# Patient Record
Sex: FEMALE | Race: BLACK OR AFRICAN AMERICAN | Employment: FULL TIME | URBAN - METROPOLITAN AREA
[De-identification: names, ages, dates, MRNs, and addresses within clinical notes are randomized per-mention and may not be internally consistent; named-entity substitution may affect disease eponyms.]

---

## 2023-01-22 ENCOUNTER — HOSPITAL ENCOUNTER (EMERGENCY)
Age: 28
Discharge: HOME | End: 2023-01-22
Attending: EMERGENCY MEDICINE

## 2023-01-22 ENCOUNTER — APPOINTMENT (OUTPATIENT)
Dept: ULTRASOUND IMAGING | Age: 28
End: 2023-01-22
Attending: NURSE PRACTITIONER

## 2023-01-22 VITALS
RESPIRATION RATE: 16 BRPM | OXYGEN SATURATION: 99 % | DIASTOLIC BLOOD PRESSURE: 79 MMHG | HEIGHT: 65 IN | HEART RATE: 77 BPM | BODY MASS INDEX: 37.15 KG/M2 | WEIGHT: 223 LBS | SYSTOLIC BLOOD PRESSURE: 127 MMHG

## 2023-01-22 DIAGNOSIS — Z34.90 EARLY STAGE OF PREGNANCY: ICD-10-CM

## 2023-01-22 DIAGNOSIS — O46.90 VAGINAL BLEEDING IN PREGNANCY: Primary | ICD-10-CM

## 2023-01-22 LAB
ABO + RH BLD: NORMAL
ALBUMIN SERPL-MCNC: 3.7 G/DL (ref 3.5–5)
ALBUMIN/GLOB SERPL: 1 (ref 1.1–2.2)
ALP SERPL-CCNC: 47 U/L (ref 45–117)
ALT SERPL-CCNC: 14 U/L (ref 12–78)
ANION GAP SERPL CALC-SCNC: 9 MMOL/L (ref 5–15)
APPEARANCE UR: ABNORMAL
AST SERPL W P-5'-P-CCNC: 11 U/L (ref 15–37)
BACTERIA URNS QL MICRO: ABNORMAL /HPF
BASOPHILS # BLD: 0 K/UL (ref 0–0.1)
BASOPHILS NFR BLD: 0 % (ref 0–1)
BILIRUB SERPL-MCNC: 0.3 MG/DL (ref 0.2–1)
BILIRUB UR QL: NEGATIVE
BUN SERPL-MCNC: 8 MG/DL (ref 6–20)
BUN/CREAT SERPL: 9 (ref 12–20)
CA-I BLD-MCNC: 9.9 MG/DL (ref 8.5–10.1)
CHLORIDE SERPL-SCNC: 104 MMOL/L (ref 97–108)
CO2 SERPL-SCNC: 24 MMOL/L (ref 21–32)
COLOR UR: YELLOW
CREAT SERPL-MCNC: 0.86 MG/DL (ref 0.55–1.02)
DIFFERENTIAL METHOD BLD: NORMAL
EOSINOPHIL # BLD: 0.1 K/UL (ref 0–0.4)
EOSINOPHIL NFR BLD: 1 % (ref 0–7)
EPITH CASTS URNS QL MICRO: ABNORMAL /LPF
ERYTHROCYTE [DISTWIDTH] IN BLOOD BY AUTOMATED COUNT: 14.4 % (ref 11.5–14.5)
GLOBULIN SER CALC-MCNC: 3.8 G/DL (ref 2–4)
GLUCOSE SERPL-MCNC: 107 MG/DL (ref 65–100)
GLUCOSE UR STRIP.AUTO-MCNC: NEGATIVE MG/DL
HCG SERPL QL: POSITIVE
HCT VFR BLD AUTO: 38.1 % (ref 35–47)
HGB BLD-MCNC: 12.5 G/DL (ref 11.5–16)
HGB UR QL STRIP: ABNORMAL
IMM GRANULOCYTES # BLD AUTO: 0 K/UL (ref 0–0.04)
IMM GRANULOCYTES NFR BLD AUTO: 0 % (ref 0–0.5)
KETONES UR QL STRIP.AUTO: NEGATIVE MG/DL
LEUKOCYTE ESTERASE UR QL STRIP.AUTO: ABNORMAL
LYMPHOCYTES # BLD: 1.7 K/UL (ref 0.8–3.5)
LYMPHOCYTES NFR BLD: 23 % (ref 12–49)
MCH RBC QN AUTO: 26.4 PG (ref 26–34)
MCHC RBC AUTO-ENTMCNC: 32.8 G/DL (ref 30–36.5)
MCV RBC AUTO: 80.4 FL (ref 80–99)
MONOCYTES # BLD: 0.6 K/UL (ref 0–1)
MONOCYTES NFR BLD: 9 % (ref 5–13)
NEUTS SEG # BLD: 5.1 K/UL (ref 1.8–8)
NEUTS SEG NFR BLD: 67 % (ref 32–75)
NITRITE UR QL STRIP.AUTO: NEGATIVE
PH UR STRIP: 6 (ref 5–8)
PLATELET # BLD AUTO: 371 K/UL (ref 150–400)
PMV BLD AUTO: 9.2 FL (ref 8.9–12.9)
POTASSIUM SERPL-SCNC: 3.8 MMOL/L (ref 3.5–5.1)
PROT SERPL-MCNC: 7.5 G/DL (ref 6.4–8.2)
PROT UR STRIP-MCNC: 30 MG/DL
RBC # BLD AUTO: 4.74 M/UL (ref 3.8–5.2)
RBC #/AREA URNS HPF: ABNORMAL /HPF (ref 0–5)
SODIUM SERPL-SCNC: 137 MMOL/L (ref 136–145)
SP GR UR REFRACTOMETRY: 1.01 (ref 1–1.03)
UROBILINOGEN UR QL STRIP.AUTO: 0.1 EU/DL (ref 0.2–1)
WBC # BLD AUTO: 7.5 K/UL (ref 3.6–11)
WBC URNS QL MICRO: ABNORMAL /HPF (ref 0–4)

## 2023-01-22 PROCEDURE — 76817 TRANSVAGINAL US OBSTETRIC: CPT

## 2023-01-22 PROCEDURE — 84703 CHORIONIC GONADOTROPIN ASSAY: CPT

## 2023-01-22 PROCEDURE — 36415 COLL VENOUS BLD VENIPUNCTURE: CPT

## 2023-01-22 PROCEDURE — 85025 COMPLETE CBC W/AUTO DIFF WBC: CPT

## 2023-01-22 PROCEDURE — 80053 COMPREHEN METABOLIC PANEL: CPT

## 2023-01-22 PROCEDURE — 99284 EMERGENCY DEPT VISIT MOD MDM: CPT

## 2023-01-22 PROCEDURE — 86900 BLOOD TYPING SEROLOGIC ABO: CPT

## 2023-01-22 PROCEDURE — 81001 URINALYSIS AUTO W/SCOPE: CPT

## 2023-01-22 PROCEDURE — 76801 OB US < 14 WKS SINGLE FETUS: CPT

## 2023-01-22 NOTE — ED TRIAGE NOTES
Pt ambulatory to [facility]. Pt  c/o of vaginal bleeding that started this morning. States she is 6 weeks pregnant. . Denies any pain, sob, cp, dizziness, weakness, lightheaded, headache. Report slight nausea. Is not currently being followed by OB.

## 2023-01-22 NOTE — DISCHARGE INSTRUCTIONS
Thank you! Thank you for allowing me to care for you in the emergency department. It is my goal to provide you with excellent care. If you have not received excellent quality care, please ask to speak to the nurse manager. Please fill out the survey that will come to you by mail or email since we listen to your feedback! Below you will find a list of your tests from today's visit. Should you have any questions, please do not hesitate to call the emergency department. Labs  Recent Results (from the past 12 hour(s))   BLOOD TYPE, (ABO+RH)    Collection Time: 01/22/23 12:20 PM   Result Value Ref Range    ABO/Rh(D) B Positive    CBC WITH AUTOMATED DIFF    Collection Time: 01/22/23 12:20 PM   Result Value Ref Range    WBC 7.5 3.6 - 11.0 K/uL    RBC 4.74 3.80 - 5.20 M/uL    HGB 12.5 11.5 - 16.0 g/dL    HCT 38.1 35.0 - 47.0 %    MCV 80.4 80.0 - 99.0 FL    MCH 26.4 26.0 - 34.0 PG    MCHC 32.8 30.0 - 36.5 g/dL    RDW 14.4 11.5 - 14.5 %    PLATELET 467 851 - 072 K/uL    MPV 9.2 8.9 - 12.9 FL    NEUTROPHILS 67 32 - 75 %    LYMPHOCYTES 23 12 - 49 %    MONOCYTES 9 5 - 13 %    EOSINOPHILS 1 0 - 7 %    BASOPHILS 0 0 - 1 %    IMMATURE GRANULOCYTES 0 0.0 - 0.5 %    ABS. NEUTROPHILS 5.1 1.8 - 8.0 K/UL    ABS. LYMPHOCYTES 1.7 0.8 - 3.5 K/UL    ABS. MONOCYTES 0.6 0.0 - 1.0 K/UL    ABS. EOSINOPHILS 0.1 0.0 - 0.4 K/UL    ABS. BASOPHILS 0.0 0.0 - 0.1 K/UL    ABS. IMM.  GRANS. 0.0 0.00 - 0.04 K/UL    DF AUTOMATED     METABOLIC PANEL, COMPREHENSIVE    Collection Time: 01/22/23 12:20 PM   Result Value Ref Range    Sodium 137 136 - 145 mmol/L    Potassium 3.8 3.5 - 5.1 mmol/L    Chloride 104 97 - 108 mmol/L    CO2 24 21 - 32 mmol/L    Anion gap 9 5 - 15 mmol/L    Glucose 107 (H) 65 - 100 mg/dL    BUN 8 6 - 20 mg/dL    Creatinine 0.86 0.55 - 1.02 mg/dL    BUN/Creatinine ratio 9 (L) 12 - 20      eGFR >60 >60 ml/min/1.73m2    Calcium 9.9 8.5 - 10.1 mg/dL    Bilirubin, total 0.3 0.2 - 1.0 mg/dL    AST (SGOT) 11 (L) 15 - 37 U/L    ALT (SGPT) 14 12 - 78 U/L    Alk. phosphatase 47 45 - 117 U/L    Protein, total 7.5 6.4 - 8.2 g/dL    Albumin 3.7 3.5 - 5.0 g/dL    Globulin 3.8 2.0 - 4.0 g/dL    A-G Ratio 1.0 (L) 1.1 - 2.2     HCG QL SERUM    Collection Time: 01/22/23 12:20 PM   Result Value Ref Range    HCG, Ql. Positive (A) Negative     URINALYSIS W/ RFLX MICROSCOPIC    Collection Time: 01/22/23 12:55 PM   Result Value Ref Range    Color Yellow      Appearance Cloudy (A) Clear      Specific gravity 1.015 1.003 - 1.030      pH (UA) 6.0 5.0 - 8.0      Protein 30 (A) Negative mg/dL    Glucose Negative Negative mg/dL    Ketone Negative Negative mg/dL    Bilirubin Negative Negative      Blood Large (A) Negative      Urobilinogen 0.1 (L) 0.2 - 1.0 EU/dL    Nitrites Negative Negative      Leukocyte Esterase Trace (A) Negative     URINE MICROSCOPIC    Collection Time: 01/22/23 12:55 PM   Result Value Ref Range    WBC 5-10 0 - 4 /hpf    RBC 20-50 0 - 5 /hpf    Epithelial cells Moderate (A) Few /lpf    Bacteria 4+ (A) Negative /hpf       Radiologic Studies  US PREG UTS < 14 WKS SNGL   Final Result   Single gestational sac with estimated gestational age of 5 weeks 6   days. A fetal pole was not identified. The ovaries are not visualized. Correlation with transvaginal ultrasound will be performed. OB transvaginal ultrasound: Realtime sonographic imaging of the pelvis was   performed transvaginal. The examination demonstrates a single gestational sac   measuring 1.22 cm with an estimated gestational age of 6 weeks 0 days. A fetal   pole is not visualized. The left ovary measures 3.1 x 2.5 x 2.1 cm and the right   ovary measures 3.1 x 1.4 x 1.3 cm. 11 mm simple cyst left ovary. The right ovary   is normal in appearance. Blood flow is documented within both ovaries. IMPRESSION: Single gestational sac with estimated gestational age of 6 weeks 0   days. A fetal pole was not visualized possibly related to the early age of the   pregnancy.  Continuing follow-up as clinically indicated. Normal ovaries. US UTS TRANSVAGINAL OB   Final Result   Single gestational sac with estimated gestational age of 10 weeks 6   days. A fetal pole was not identified. The ovaries are not visualized. Correlation with transvaginal ultrasound will be performed. OB transvaginal ultrasound: Realtime sonographic imaging of the pelvis was   performed transvaginal. The examination demonstrates a single gestational sac   measuring 1.22 cm with an estimated gestational age of 6 weeks 0 days. A fetal   pole is not visualized. The left ovary measures 3.1 x 2.5 x 2.1 cm and the right   ovary measures 3.1 x 1.4 x 1.3 cm. 11 mm simple cyst left ovary. The right ovary   is normal in appearance. Blood flow is documented within both ovaries. IMPRESSION: Single gestational sac with estimated gestational age of 6 weeks 0   days. A fetal pole was not visualized possibly related to the early age of the   pregnancy. Continuing follow-up as clinically indicated. Normal ovaries. CT Results  (Last 48 hours)      None          CXR Results  (Last 48 hours)      None          ------------------------------------------------------------------------------------------------------------  The exam and treatment you received in the Emergency Department were for an urgent problem and are not intended as complete care. It is important that you follow-up with a doctor, nurse practitioner, or physician assistant to:  (1) confirm your diagnosis,  (2) re-evaluation of changes in your illness and treatment, and  (3) for ongoing care. Please take your discharge instructions with you when you go to your follow-up appointment. If you have any problem arranging a follow-up appointment, contact the Emergency Department. If your symptoms become worse or you do not improve as expected and you are unable to reach your health care provider, please return to the Emergency Department.  We are available 24 hours a day. If a prescription has been provided, please have it filled as soon as possible to prevent a delay in treatment. If you have any questions or reservations about taking the medication due to side effects or interactions with other medications, please call your primary care provider or contact the ER.

## 2023-01-22 NOTE — ED PROVIDER NOTES
[facility] EMERGENCY DEPARTMENT  EMERGENCY DEPARTMENT HISTORY AND PHYSICAL EXAM      Date: 1/22/2023  Patient Name: Haseeb Crowley  MRN: [id number]  YOB: 1995  Date of evaluation: 1/22/2023  Provider: Katherin Mensah NP   Note Started: 12:07 PM 1/22/23    HISTORY OF PRESENT ILLNESS     Chief Complaint   Patient presents with    Vaginal Bleeding       History Provided By: Patient    HPI: Howard Alexander, 24 y.o. female presented to the ED complaining of vaginal bleeding. Patient states she is having light bleeding after she goes to the restroom which started this morning. Patient states she had a positive pregnancy test at home approximately 1 week ago. She has a history of miscarriage in the past. She is not currently taking any medications at this time. PAST MEDICAL HISTORY   Past Medical History:  No past medical history on file. Past Surgical History:  No past surgical history on file. Family History:  No family history on file. Social History:  Social History     Tobacco Use    Smoking status: Never    Smokeless tobacco: Never   Substance Use Topics    Drug use: Never       Allergies:  No Known Allergies    PCP: None    Current Meds:   Previous Medications    No medications on file       REVIEW OF SYSTEMS   Review of Systems   Neurological:  Positive for numbness. Positives and Pertinent negatives as per HPI. PHYSICAL EXAM     ED Triage Vitals [01/22/23 1156]   ED Encounter Vitals Group      /79      Pulse (Heart Rate) 77      Resp Rate 16      Temp       Temp src       O2 Sat (%) 99 %      Weight 223 lb      Height 5' 5\"      Physical Exam  Constitutional:       Appearance: Normal appearance. She is normal weight. Cardiovascular:      Rate and Rhythm: Normal rate and regular rhythm. Pulses: Normal pulses. Heart sounds: Normal heart sounds. Genitourinary:     Vagina: Vaginal discharge present. Neurological:      Mental Status: She is alert. LAB, EKG AND DIAGNOSTIC RESULTS   Labs:  Recent Results (from the past 12 hour(s))   BLOOD TYPE, (ABO+RH)    Collection Time: 01/22/23 12:20 PM   Result Value Ref Range    ABO/Rh(D) B Positive    CBC WITH AUTOMATED DIFF    Collection Time: 01/22/23 12:20 PM   Result Value Ref Range    WBC 7.5 3.6 - 11.0 K/uL    RBC 4.74 3.80 - 5.20 M/uL    HGB 12.5 11.5 - 16.0 g/dL    HCT 38.1 35.0 - 47.0 %    MCV 80.4 80.0 - 99.0 FL    MCH 26.4 26.0 - 34.0 PG    MCHC 32.8 30.0 - 36.5 g/dL    RDW 14.4 11.5 - 14.5 %    PLATELET 372 289 - 861 K/uL    MPV 9.2 8.9 - 12.9 FL    NEUTROPHILS 67 32 - 75 %    LYMPHOCYTES 23 12 - 49 %    MONOCYTES 9 5 - 13 %    EOSINOPHILS 1 0 - 7 %    BASOPHILS 0 0 - 1 %    IMMATURE GRANULOCYTES 0 0.0 - 0.5 %    ABS. NEUTROPHILS 5.1 1.8 - 8.0 K/UL    ABS. LYMPHOCYTES 1.7 0.8 - 3.5 K/UL    ABS. MONOCYTES 0.6 0.0 - 1.0 K/UL    ABS. EOSINOPHILS 0.1 0.0 - 0.4 K/UL    ABS. BASOPHILS 0.0 0.0 - 0.1 K/UL    ABS. IMM. GRANS. 0.0 0.00 - 0.04 K/UL    DF AUTOMATED     METABOLIC PANEL, COMPREHENSIVE    Collection Time: 01/22/23 12:20 PM   Result Value Ref Range    Sodium 137 136 - 145 mmol/L    Potassium 3.8 3.5 - 5.1 mmol/L    Chloride 104 97 - 108 mmol/L    CO2 24 21 - 32 mmol/L    Anion gap 9 5 - 15 mmol/L    Glucose 107 (H) 65 - 100 mg/dL    BUN 8 6 - 20 mg/dL    Creatinine 0.86 0.55 - 1.02 mg/dL    BUN/Creatinine ratio 9 (L) 12 - 20      eGFR >60 >60 ml/min/1.73m2    Calcium 9.9 8.5 - 10.1 mg/dL    Bilirubin, total 0.3 0.2 - 1.0 mg/dL    AST (SGOT) 11 (L) 15 - 37 U/L    ALT (SGPT) 14 12 - 78 U/L    Alk.  phosphatase 47 45 - 117 U/L    Protein, total 7.5 6.4 - 8.2 g/dL    Albumin 3.7 3.5 - 5.0 g/dL    Globulin 3.8 2.0 - 4.0 g/dL    A-G Ratio 1.0 (L) 1.1 - 2.2     HCG QL SERUM    Collection Time: 01/22/23 12:20 PM   Result Value Ref Range    HCG, Ql. Positive (A) Negative     URINALYSIS W/ RFLX MICROSCOPIC    Collection Time: 01/22/23 12:55 PM   Result Value Ref Range    Color Yellow      Appearance Cloudy (A) Clear      Specific gravity 1.015 1.003 - 1.030      pH (UA) 6.0 5.0 - 8.0      Protein 30 (A) Negative mg/dL    Glucose Negative Negative mg/dL    Ketone Negative Negative mg/dL    Bilirubin Negative Negative      Blood Large (A) Negative      Urobilinogen 0.1 (L) 0.2 - 1.0 EU/dL    Nitrites Negative Negative      Leukocyte Esterase Trace (A) Negative     URINE MICROSCOPIC    Collection Time: 01/22/23 12:55 PM   Result Value Ref Range    WBC 5-10 0 - 4 /hpf    RBC 20-50 0 - 5 /hpf    Epithelial cells Moderate (A) Few /lpf    Bacteria 4+ (A) Negative /hpf       Radiologic Studies:  Non-plain film images such as CT, Ultrasound and MRI are read by the radiologist. Plain radiographic images are visualized and preliminarily interpreted by the ED Provider with the below findings:    Interpretation per the Radiologist below, if available at the time of this note:  US UTS TRANSVAGINAL OB    Result Date: 1/22/2023  Clinical history: Pregnant, vaginal bleeding OB ultrasound: Realtime sonographic imaging of the pelvis was performed transabdominally. The uterus measures 8.2 x 4.8 x 6.2 cm. The examination demonstrates a gestational sac measuring 1.15 cm with an estimated gestational age of 10 weeks 6 days. The gestational sac is normal in size and appearance. A fetal pole was not visualized. The ovaries are not seen due to bowel gas. Single gestational sac with estimated gestational age of 10 weeks 6 days. A fetal pole was not identified. The ovaries are not visualized. Correlation with transvaginal ultrasound will be performed. OB transvaginal ultrasound: Realtime sonographic imaging of the pelvis was performed transvaginal. The examination demonstrates a single gestational sac measuring 1.22 cm with an estimated gestational age of 6 weeks 0 days. A fetal pole is not visualized. The left ovary measures 3.1 x 2.5 x 2.1 cm and the right ovary measures 3.1 x 1.4 x 1.3 cm. 11 mm simple cyst left ovary.  The right ovary is normal in appearance. Blood flow is documented within both ovaries. IMPRESSION: Single gestational sac with estimated gestational age of 6 weeks 0 days. A fetal pole was not visualized possibly related to the early age of the pregnancy. Continuing follow-up as clinically indicated. Normal ovaries. US PREG UTS < 14 WKS SNGL    Result Date: 1/22/2023  Clinical history: Pregnant, vaginal bleeding OB ultrasound: Realtime sonographic imaging of the pelvis was performed transabdominally. The uterus measures 8.2 x 4.8 x 6.2 cm. The examination demonstrates a gestational sac measuring 1.15 cm with an estimated gestational age of 10 weeks 6 days. The gestational sac is normal in size and appearance. A fetal pole was not visualized. The ovaries are not seen due to bowel gas. Single gestational sac with estimated gestational age of 10 weeks 6 days. A fetal pole was not identified. The ovaries are not visualized. Correlation with transvaginal ultrasound will be performed. OB transvaginal ultrasound: Realtime sonographic imaging of the pelvis was performed transvaginal. The examination demonstrates a single gestational sac measuring 1.22 cm with an estimated gestational age of 6 weeks 0 days. A fetal pole is not visualized. The left ovary measures 3.1 x 2.5 x 2.1 cm and the right ovary measures 3.1 x 1.4 x 1.3 cm. 11 mm simple cyst left ovary. The right ovary is normal in appearance. Blood flow is documented within both ovaries. IMPRESSION: Single gestational sac with estimated gestational age of 6 weeks 0 days. A fetal pole was not visualized possibly related to the early age of the pregnancy. Continuing follow-up as clinically indicated. Normal ovaries. PROCEDURES   Unless otherwise noted below, none. Performed by:  Natalie Quinonez NP   Procedures      ED COURSE and DIFFERENTIAL DIAGNOSIS/MDM   Vitals:    Vitals:    01/22/23 1156   BP: 127/79   Pulse: 77   Resp: 16   SpO2: 99%   Weight: 101.2 kg (223 lb) Height: 5' 5\" (1.651 m)        Patient was given the following medications:  Medications - No data to display    CONSULTS: (Who and What was discussed)  None     Chronic Conditions: None  Social Determinants affecting Dx or Tx: None  Counseling:      Records Reviewed (source and summary of external notes): Nursing Notes    CC/HPI Summary, DDx, ED Course, and Reassessment:  Ddx: Threatened AB, Early pregnancy. Patient presented to the ED complaining of vaginal bleeding, which started this am. Patient states she notices a small amount of blood when she wipes. No cramping noted. She has a history of miscarriage in the past. Labs and transvaginal US ordered and results show 6 week 0 day, gestational sac with no fetal pole. Recommend follow-up with OB/Gyn for follow-up management. Disposition Considerations (Tests not done, Shared Decision Making, Pt Expectation of Test or Treatment.): Discharge home. FINAL IMPRESSION     1. Vaginal bleeding in pregnancy          DISPOSITION/PLAN       Discharge Note: The patient is stable for discharge home. The signs, symptoms, diagnosis, and discharge instructions have been discussed, understanding conveyed, and agreed upon. The patient is to follow up as recommended or return to ER should their symptoms worsen. PATIENT REFERRED TO:  Follow-up Information    None           DISCHARGE MEDICATIONS:  There are no discharge medications for this patient. DISCONTINUED MEDICATIONS:  There are no discharge medications for this patient. I am the Primary Clinician of Record: Thompson Horowitz NP (electronically signed)    (Please note that parts of this dictation were completed with voice recognition software. Quite often unanticipated grammatical, syntax, homophones, and other interpretive errors are inadvertently transcribed by the computer software. Please disregards these errors.  Please excuse any errors that have escaped final proofreading.)

## 2023-03-09 LAB
ABO, EXTERNAL RESULT: NORMAL
C. TRACHOMATIS, EXTERNAL RESULT: NEGATIVE
HEP B, EXTERNAL RESULT: NEGATIVE
HEPATITIS C ANTIBODY, EXTERNAL RESULT: NEGATIVE
HIV, EXTERNAL RESULT: NEGATIVE
N. GONORRHOEAE, EXTERNAL RESULT: NEGATIVE
RH FACTOR, EXTERNAL RESULT: POSITIVE
RPR, EXTERNAL RESULT: NORMAL
RUBELLA TITER, EXTERNAL RESULT: NORMAL

## 2023-08-28 LAB — GBS, EXTERNAL RESULT: POSITIVE

## 2023-09-20 ENCOUNTER — HOSPITAL ENCOUNTER (INPATIENT)
Facility: HOSPITAL | Age: 28
LOS: 3 days | Discharge: HOME | End: 2023-09-23
Attending: STUDENT IN AN ORGANIZED HEALTH CARE EDUCATION/TRAINING PROGRAM | Admitting: STUDENT IN AN ORGANIZED HEALTH CARE EDUCATION/TRAINING PROGRAM
Payer: COMMERCIAL

## 2023-09-20 ENCOUNTER — ANESTHESIA EVENT (OUTPATIENT)
Facility: HOSPITAL | Age: 28
End: 2023-09-20
Payer: COMMERCIAL

## 2023-09-20 ENCOUNTER — ANESTHESIA (OUTPATIENT)
Facility: HOSPITAL | Age: 28
End: 2023-09-20
Payer: COMMERCIAL

## 2023-09-20 DIAGNOSIS — Z98.891 S/P CESAREAN SECTION: Primary | ICD-10-CM

## 2023-09-20 PROBLEM — Z33.1 PREGNANCY AS INCIDENTAL FINDING: Status: ACTIVE | Noted: 2023-09-20

## 2023-09-20 LAB
ABO + RH BLD: NORMAL
BASOPHILS # BLD: 0 K/UL (ref 0–0.1)
BASOPHILS NFR BLD: 0 % (ref 0–1)
BLD PROD TYP BPU: NORMAL
BLOOD BANK DISPENSE STATUS: NORMAL
BLOOD GROUP ANTIBODIES SERPL: NORMAL
BPU ID: NORMAL
CROSSMATCH RESULT: NORMAL
DIFFERENTIAL METHOD BLD: ABNORMAL
EOSINOPHIL # BLD: 0 K/UL (ref 0–0.4)
EOSINOPHIL NFR BLD: 1 % (ref 0–7)
ERYTHROCYTE [DISTWIDTH] IN BLOOD BY AUTOMATED COUNT: 17.8 % (ref 11.5–14.5)
HCT VFR BLD AUTO: 31.7 % (ref 35–47)
HGB BLD-MCNC: 9.9 G/DL (ref 11.5–16)
HISTORY CHECK: NORMAL
IMM GRANULOCYTES # BLD AUTO: 0 K/UL (ref 0–0.04)
IMM GRANULOCYTES NFR BLD AUTO: 0 % (ref 0–0.5)
LYMPHOCYTES # BLD: 1.4 K/UL (ref 0.8–3.5)
LYMPHOCYTES NFR BLD: 20 % (ref 12–49)
MCH RBC QN AUTO: 23.7 PG (ref 26–34)
MCHC RBC AUTO-ENTMCNC: 31.2 G/DL (ref 30–36.5)
MCV RBC AUTO: 75.8 FL (ref 80–99)
MONOCYTES # BLD: 0.6 K/UL (ref 0–1)
MONOCYTES NFR BLD: 8 % (ref 5–13)
NEUTS SEG # BLD: 5.2 K/UL (ref 1.8–8)
NEUTS SEG NFR BLD: 71 % (ref 32–75)
NRBC # BLD: 0 K/UL (ref 0–0.01)
NRBC BLD-RTO: 0 PER 100 WBC
PLATELET # BLD AUTO: 290 K/UL (ref 150–400)
PMV BLD AUTO: 9.6 FL (ref 8.9–12.9)
RBC # BLD AUTO: 4.18 M/UL (ref 3.8–5.2)
SPECIMEN EXP DATE BLD: NORMAL
UNIT DIVISION: 0
WBC # BLD AUTO: 7.3 K/UL (ref 3.6–11)

## 2023-09-20 PROCEDURE — 6360000002 HC RX W HCPCS: Performed by: NURSE ANESTHETIST, CERTIFIED REGISTERED

## 2023-09-20 PROCEDURE — 6360000002 HC RX W HCPCS: Performed by: ANESTHESIOLOGY

## 2023-09-20 PROCEDURE — 86850 RBC ANTIBODY SCREEN: CPT

## 2023-09-20 PROCEDURE — 7100000001 HC PACU RECOVERY - ADDTL 15 MIN: Performed by: OBSTETRICS & GYNECOLOGY

## 2023-09-20 PROCEDURE — 2709999900 HC NON-CHARGEABLE SUPPLY: Performed by: OBSTETRICS & GYNECOLOGY

## 2023-09-20 PROCEDURE — 3609079900 HC CESAREAN SECTION: Performed by: OBSTETRICS & GYNECOLOGY

## 2023-09-20 PROCEDURE — 85025 COMPLETE CBC W/AUTO DIFF WBC: CPT

## 2023-09-20 PROCEDURE — 4A0HXCZ MEASUREMENT OF PRODUCTS OF CONCEPTION, CARDIAC RATE, EXTERNAL APPROACH: ICD-10-PCS | Performed by: STUDENT IN AN ORGANIZED HEALTH CARE EDUCATION/TRAINING PROGRAM

## 2023-09-20 PROCEDURE — 2500000003 HC RX 250 WO HCPCS: Performed by: NURSE ANESTHETIST, CERTIFIED REGISTERED

## 2023-09-20 PROCEDURE — 2580000003 HC RX 258: Performed by: NURSE ANESTHETIST, CERTIFIED REGISTERED

## 2023-09-20 PROCEDURE — 88307 TISSUE EXAM BY PATHOLOGIST: CPT

## 2023-09-20 PROCEDURE — 6360000002 HC RX W HCPCS: Performed by: STUDENT IN AN ORGANIZED HEALTH CARE EDUCATION/TRAINING PROGRAM

## 2023-09-20 PROCEDURE — 6370000000 HC RX 637 (ALT 250 FOR IP): Performed by: STUDENT IN AN ORGANIZED HEALTH CARE EDUCATION/TRAINING PROGRAM

## 2023-09-20 PROCEDURE — 7100000000 HC PACU RECOVERY - FIRST 15 MIN: Performed by: OBSTETRICS & GYNECOLOGY

## 2023-09-20 PROCEDURE — 36415 COLL VENOUS BLD VENIPUNCTURE: CPT

## 2023-09-20 PROCEDURE — 3700000000 HC ANESTHESIA ATTENDED CARE: Performed by: OBSTETRICS & GYNECOLOGY

## 2023-09-20 PROCEDURE — 1120000000 HC RM PRIVATE OB

## 2023-09-20 PROCEDURE — 86901 BLOOD TYPING SEROLOGIC RH(D): CPT

## 2023-09-20 PROCEDURE — 2580000003 HC RX 258: Performed by: STUDENT IN AN ORGANIZED HEALTH CARE EDUCATION/TRAINING PROGRAM

## 2023-09-20 PROCEDURE — 99465 NB RESUSCITATION: CPT

## 2023-09-20 PROCEDURE — 94761 N-INVAS EAR/PLS OXIMETRY MLT: CPT

## 2023-09-20 PROCEDURE — 86900 BLOOD TYPING SEROLOGIC ABO: CPT

## 2023-09-20 PROCEDURE — 3700000001 HC ADD 15 MINUTES (ANESTHESIA): Performed by: OBSTETRICS & GYNECOLOGY

## 2023-09-20 RX ORDER — TRANEXAMIC ACID 100 MG/ML
INJECTION, SOLUTION INTRAVENOUS PRN
Status: DISCONTINUED | OUTPATIENT
Start: 2023-09-20 | End: 2023-09-20 | Stop reason: SDUPTHER

## 2023-09-20 RX ORDER — ONDANSETRON 2 MG/ML
4 INJECTION INTRAMUSCULAR; INTRAVENOUS
Status: DISCONTINUED | OUTPATIENT
Start: 2023-09-20 | End: 2023-09-20 | Stop reason: HOSPADM

## 2023-09-20 RX ORDER — SODIUM CHLORIDE, SODIUM LACTATE, POTASSIUM CHLORIDE, CALCIUM CHLORIDE 600; 310; 30; 20 MG/100ML; MG/100ML; MG/100ML; MG/100ML
INJECTION, SOLUTION INTRAVENOUS CONTINUOUS PRN
Status: DISCONTINUED | OUTPATIENT
Start: 2023-09-20 | End: 2023-09-20 | Stop reason: SDUPTHER

## 2023-09-20 RX ORDER — MEPERIDINE HYDROCHLORIDE 25 MG/ML
12.5 INJECTION INTRAMUSCULAR; INTRAVENOUS; SUBCUTANEOUS EVERY 5 MIN PRN
Status: DISCONTINUED | OUTPATIENT
Start: 2023-09-20 | End: 2023-09-20 | Stop reason: HOSPADM

## 2023-09-20 RX ORDER — OXYCODONE HYDROCHLORIDE 5 MG/1
10 TABLET ORAL EVERY 4 HOURS PRN
Status: ACTIVE | OUTPATIENT
Start: 2023-09-20 | End: 2023-09-21

## 2023-09-20 RX ORDER — ACETAMINOPHEN 325 MG/1
650 TABLET ORAL EVERY 4 HOURS PRN
Status: DISCONTINUED | OUTPATIENT
Start: 2023-09-20 | End: 2023-09-23 | Stop reason: HOSPADM

## 2023-09-20 RX ORDER — MIDAZOLAM HYDROCHLORIDE 1 MG/ML
INJECTION INTRAMUSCULAR; INTRAVENOUS PRN
Status: DISCONTINUED | OUTPATIENT
Start: 2023-09-20 | End: 2023-09-20 | Stop reason: SDUPTHER

## 2023-09-20 RX ORDER — FENTANYL CITRATE 50 UG/ML
INJECTION, SOLUTION INTRAMUSCULAR; INTRAVENOUS PRN
Status: DISCONTINUED | OUTPATIENT
Start: 2023-09-20 | End: 2023-09-20 | Stop reason: SDUPTHER

## 2023-09-20 RX ORDER — WATER 1000 ML/1000ML
INJECTION, SOLUTION INTRAVENOUS
Status: DISPENSED
Start: 2023-09-20 | End: 2023-09-20

## 2023-09-20 RX ORDER — DIPHENHYDRAMINE HYDROCHLORIDE 50 MG/ML
12.5 INJECTION INTRAMUSCULAR; INTRAVENOUS
Status: DISCONTINUED | OUTPATIENT
Start: 2023-09-20 | End: 2023-09-20 | Stop reason: HOSPADM

## 2023-09-20 RX ORDER — ONDANSETRON 2 MG/ML
INJECTION INTRAMUSCULAR; INTRAVENOUS PRN
Status: DISCONTINUED | OUTPATIENT
Start: 2023-09-20 | End: 2023-09-20 | Stop reason: SDUPTHER

## 2023-09-20 RX ORDER — SODIUM CHLORIDE 0.9 % (FLUSH) 0.9 %
5-40 SYRINGE (ML) INJECTION EVERY 12 HOURS SCHEDULED
Status: DISCONTINUED | OUTPATIENT
Start: 2023-09-20 | End: 2023-09-22 | Stop reason: ALTCHOICE

## 2023-09-20 RX ORDER — SODIUM CHLORIDE 9 MG/ML
INJECTION, SOLUTION INTRAVENOUS PRN
Status: DISCONTINUED | OUTPATIENT
Start: 2023-09-20 | End: 2023-09-22 | Stop reason: ALTCHOICE

## 2023-09-20 RX ORDER — OXYCODONE HYDROCHLORIDE 5 MG/1
5 TABLET ORAL EVERY 4 HOURS PRN
Status: ACTIVE | OUTPATIENT
Start: 2023-09-20 | End: 2023-09-21

## 2023-09-20 RX ORDER — SODIUM CHLORIDE 0.9 % (FLUSH) 0.9 %
5-40 SYRINGE (ML) INJECTION PRN
Status: DISCONTINUED | OUTPATIENT
Start: 2023-09-20 | End: 2023-09-22 | Stop reason: ALTCHOICE

## 2023-09-20 RX ORDER — DROPERIDOL 2.5 MG/ML
0.62 INJECTION, SOLUTION INTRAMUSCULAR; INTRAVENOUS
Status: DISCONTINUED | OUTPATIENT
Start: 2023-09-20 | End: 2023-09-20 | Stop reason: HOSPADM

## 2023-09-20 RX ORDER — OXYCODONE HYDROCHLORIDE 5 MG/1
5 TABLET ORAL EVERY 4 HOURS PRN
Status: DISCONTINUED | OUTPATIENT
Start: 2023-09-20 | End: 2023-09-23 | Stop reason: HOSPADM

## 2023-09-20 RX ORDER — METHYLERGONOVINE MALEATE 0.2 MG/ML
INJECTION INTRAVENOUS PRN
Status: DISCONTINUED | OUTPATIENT
Start: 2023-09-20 | End: 2023-09-20 | Stop reason: ALTCHOICE

## 2023-09-20 RX ORDER — NEOSTIGMINE METHYLSULFATE 1 MG/ML
INJECTION, SOLUTION INTRAVENOUS PRN
Status: DISCONTINUED | OUTPATIENT
Start: 2023-09-20 | End: 2023-09-20 | Stop reason: SDUPTHER

## 2023-09-20 RX ORDER — SODIUM CHLORIDE, SODIUM LACTATE, POTASSIUM CHLORIDE, CALCIUM CHLORIDE 600; 310; 30; 20 MG/100ML; MG/100ML; MG/100ML; MG/100ML
INJECTION, SOLUTION INTRAVENOUS CONTINUOUS
Status: DISCONTINUED | OUTPATIENT
Start: 2023-09-20 | End: 2023-09-22 | Stop reason: ALTCHOICE

## 2023-09-20 RX ORDER — LABETALOL HYDROCHLORIDE 5 MG/ML
10 INJECTION, SOLUTION INTRAVENOUS
Status: DISCONTINUED | OUTPATIENT
Start: 2023-09-20 | End: 2023-09-20 | Stop reason: HOSPADM

## 2023-09-20 RX ORDER — OXYTOCIN 10 [USP'U]/ML
INJECTION, SOLUTION INTRAMUSCULAR; INTRAVENOUS PRN
Status: DISCONTINUED | OUTPATIENT
Start: 2023-09-20 | End: 2023-09-20 | Stop reason: SDUPTHER

## 2023-09-20 RX ORDER — OXYCODONE HYDROCHLORIDE 5 MG/1
10 TABLET ORAL EVERY 6 HOURS PRN
Status: DISCONTINUED | OUTPATIENT
Start: 2023-09-20 | End: 2023-09-23 | Stop reason: HOSPADM

## 2023-09-20 RX ORDER — CEFAZOLIN SODIUM 1 G/3ML
INJECTION, POWDER, FOR SOLUTION INTRAMUSCULAR; INTRAVENOUS
Status: DISPENSED
Start: 2023-09-20 | End: 2023-09-20

## 2023-09-20 RX ORDER — DOCUSATE SODIUM 100 MG/1
100 CAPSULE, LIQUID FILLED ORAL 2 TIMES DAILY
Status: DISCONTINUED | OUTPATIENT
Start: 2023-09-20 | End: 2023-09-23 | Stop reason: HOSPADM

## 2023-09-20 RX ORDER — GLYCOPYRROLATE 0.2 MG/ML
INJECTION INTRAMUSCULAR; INTRAVENOUS PRN
Status: DISCONTINUED | OUTPATIENT
Start: 2023-09-20 | End: 2023-09-20 | Stop reason: SDUPTHER

## 2023-09-20 RX ORDER — IBUPROFEN 600 MG/1
600 TABLET ORAL EVERY 6 HOURS PRN
Status: DISCONTINUED | OUTPATIENT
Start: 2023-09-20 | End: 2023-09-23 | Stop reason: HOSPADM

## 2023-09-20 RX ORDER — ROCURONIUM BROMIDE 10 MG/ML
INJECTION, SOLUTION INTRAVENOUS PRN
Status: DISCONTINUED | OUTPATIENT
Start: 2023-09-20 | End: 2023-09-20 | Stop reason: SDUPTHER

## 2023-09-20 RX ORDER — KETOROLAC TROMETHAMINE 30 MG/ML
30 INJECTION, SOLUTION INTRAMUSCULAR; INTRAVENOUS EVERY 6 HOURS
Status: DISPENSED | OUTPATIENT
Start: 2023-09-20 | End: 2023-09-21

## 2023-09-20 RX ORDER — NALOXONE HYDROCHLORIDE 0.4 MG/ML
INJECTION, SOLUTION INTRAMUSCULAR; INTRAVENOUS; SUBCUTANEOUS PRN
Status: ACTIVE | OUTPATIENT
Start: 2023-09-20 | End: 2023-09-21

## 2023-09-20 RX ORDER — SUCCINYLCHOLINE CHLORIDE 20 MG/ML
INJECTION INTRAMUSCULAR; INTRAVENOUS PRN
Status: DISCONTINUED | OUTPATIENT
Start: 2023-09-20 | End: 2023-09-20 | Stop reason: SDUPTHER

## 2023-09-20 RX ORDER — PROPOFOL 10 MG/ML
INJECTION, EMULSION INTRAVENOUS PRN
Status: DISCONTINUED | OUTPATIENT
Start: 2023-09-20 | End: 2023-09-20 | Stop reason: SDUPTHER

## 2023-09-20 RX ORDER — LANOLIN/MINERAL OIL
LOTION (ML) TOPICAL
Status: DISCONTINUED | OUTPATIENT
Start: 2023-09-20 | End: 2023-09-23 | Stop reason: HOSPADM

## 2023-09-20 RX ADMIN — ONDANSETRON HYDROCHLORIDE 4 MG: 2 SOLUTION INTRAMUSCULAR; INTRAVENOUS at 09:51

## 2023-09-20 RX ADMIN — SODIUM CHLORIDE, SODIUM LACTATE, POTASSIUM CHLORIDE, CALCIUM CHLORIDE: 600; 310; 30; 20 INJECTION, SOLUTION INTRAVENOUS at 08:42

## 2023-09-20 RX ADMIN — MIDAZOLAM HYDROCHLORIDE 2 MG: 1 INJECTION, SOLUTION INTRAMUSCULAR; INTRAVENOUS at 09:15

## 2023-09-20 RX ADMIN — GLYCOPYRROLATE 0.3 MG: 0.2 INJECTION INTRAMUSCULAR; INTRAVENOUS at 09:45

## 2023-09-20 RX ADMIN — MIDAZOLAM HYDROCHLORIDE 1 MG: 1 INJECTION, SOLUTION INTRAMUSCULAR; INTRAVENOUS at 09:32

## 2023-09-20 RX ADMIN — FENTANYL CITRATE 50 MCG: 50 INJECTION, SOLUTION INTRAMUSCULAR; INTRAVENOUS at 09:31

## 2023-09-20 RX ADMIN — HYDROMORPHONE HYDROCHLORIDE 0.5 MG: 1 INJECTION, SOLUTION INTRAMUSCULAR; INTRAVENOUS; SUBCUTANEOUS at 14:02

## 2023-09-20 RX ADMIN — TRANEXAMIC ACID 1000 MG: 100 INJECTION, SOLUTION INTRAVENOUS at 09:15

## 2023-09-20 RX ADMIN — SUCCINYLCHOLINE CHLORIDE 100 MG: 20 INJECTION, SOLUTION INTRAMUSCULAR; INTRAVENOUS at 09:07

## 2023-09-20 RX ADMIN — KETOROLAC TROMETHAMINE 30 MG: 30 INJECTION, SOLUTION INTRAMUSCULAR; INTRAVENOUS at 16:59

## 2023-09-20 RX ADMIN — PROPOFOL 50 MG: 10 INJECTION, EMULSION INTRAVENOUS at 09:46

## 2023-09-20 RX ADMIN — HYDROMORPHONE HYDROCHLORIDE 0.5 MG: 1 INJECTION, SOLUTION INTRAMUSCULAR; INTRAVENOUS; SUBCUTANEOUS at 11:41

## 2023-09-20 RX ADMIN — NEOSTIGMINE METHYLSULFATE 3 MG: 1 INJECTION, SOLUTION INTRAVENOUS at 09:45

## 2023-09-20 RX ADMIN — DOCUSATE SODIUM 100 MG: 100 CAPSULE, LIQUID FILLED ORAL at 21:15

## 2023-09-20 RX ADMIN — KETOROLAC TROMETHAMINE 30 MG: 30 INJECTION, SOLUTION INTRAMUSCULAR; INTRAVENOUS at 22:51

## 2023-09-20 RX ADMIN — SODIUM CHLORIDE, POTASSIUM CHLORIDE, SODIUM LACTATE AND CALCIUM CHLORIDE: 600; 310; 30; 20 INJECTION, SOLUTION INTRAVENOUS at 09:05

## 2023-09-20 RX ADMIN — ACETAMINOPHEN 650 MG: 325 TABLET ORAL at 16:59

## 2023-09-20 RX ADMIN — PROPOFOL 200 MG: 10 INJECTION, EMULSION INTRAVENOUS at 09:07

## 2023-09-20 RX ADMIN — OXYTOCIN 30 UNITS: 10 INJECTION, SOLUTION INTRAMUSCULAR; INTRAVENOUS at 09:15

## 2023-09-20 RX ADMIN — FENTANYL CITRATE 100 MCG: 50 INJECTION, SOLUTION INTRAMUSCULAR; INTRAVENOUS at 09:13

## 2023-09-20 RX ADMIN — ACETAMINOPHEN 650 MG: 325 TABLET ORAL at 22:50

## 2023-09-20 RX ADMIN — ROCURONIUM BROMIDE 20 MG: 10 INJECTION INTRAVENOUS at 09:17

## 2023-09-20 RX ADMIN — FENTANYL CITRATE 50 MCG: 50 INJECTION, SOLUTION INTRAMUSCULAR; INTRAVENOUS at 10:04

## 2023-09-20 ASSESSMENT — PAIN DESCRIPTION - ORIENTATION
ORIENTATION: LOWER

## 2023-09-20 ASSESSMENT — PAIN - FUNCTIONAL ASSESSMENT
PAIN_FUNCTIONAL_ASSESSMENT: PREVENTS OR INTERFERES SOME ACTIVE ACTIVITIES AND ADLS
PAIN_FUNCTIONAL_ASSESSMENT: ACTIVITIES ARE NOT PREVENTED
PAIN_FUNCTIONAL_ASSESSMENT: PREVENTS OR INTERFERES SOME ACTIVE ACTIVITIES AND ADLS

## 2023-09-20 ASSESSMENT — PAIN SCALES - GENERAL
PAINLEVEL_OUTOF10: 2
PAINLEVEL_OUTOF10: 10
PAINLEVEL_OUTOF10: 7
PAINLEVEL_OUTOF10: 5
PAINLEVEL_OUTOF10: 10

## 2023-09-20 ASSESSMENT — PAIN DESCRIPTION - DESCRIPTORS
DESCRIPTORS: CRAMPING
DESCRIPTORS: SORE
DESCRIPTORS: ACHING
DESCRIPTORS: SORE
DESCRIPTORS: SHARP
DESCRIPTORS: SHARP;ACHING

## 2023-09-20 ASSESSMENT — PAIN DESCRIPTION - LOCATION
LOCATION: ABDOMEN;INCISION
LOCATION: ABDOMEN
LOCATION: ABDOMEN
LOCATION: ABDOMEN;INCISION
LOCATION: ABDOMEN;INCISION
LOCATION: ABDOMEN

## 2023-09-20 ASSESSMENT — PAIN DESCRIPTION - PAIN TYPE: TYPE: SURGICAL PAIN

## 2023-09-20 ASSESSMENT — PAIN DESCRIPTION - ONSET: ONSET: GRADUAL

## 2023-09-20 ASSESSMENT — PAIN DESCRIPTION - FREQUENCY: FREQUENCY: CONTINUOUS

## 2023-09-20 NOTE — PROGRESS NOTES
0825- Pt arrives to the unit ambulatory and stable with c/o of possible SROM at 0700AM today with a \"green color\". 0827- Nitrazine positive for amniotic fluid     0839- MD Eddy called by MARCOS Cosme at bedside    1409 [id number] by MD Finn Platt. Pt is 3cm    0846- FSE placed by MD Rosa Maria Beckman- MD Turner at bedside     3095- Bedside ultrasound by MD Rosa Maria Beckman- Emergency C-section called by MD Finn Platt. Pt getting consented for procedure and educated. 0901- In OR for . Please see  summary for further details. 0558- FHT done in OR via FSE heart rate is 172.    0905- FSE removed at this time by Jerry Omer. RN    1010- Pt in PACU. PACU RN assuming care of pt at this time. 1050- This RN assuming care of pt       1130- Bedside shift change report given to Urszula Mckeon (oncoming nurse) by TERE Hoyt RN (offgoing nurse). Report included the following information Nurse Handoff Report, Adult Overview, Surgery Report, Intake/Output, MAR, and Recent Results.

## 2023-09-20 NOTE — L&D DELIVERY NOTE
Wims, Female Nikki Backer [[id number]]      Labor Events     Labor: No   Steroids: None  Cervical Ripening Date/Time:      Antibiotics Received during Labor: Yes  Rupture Identifier: Sac 1  Rupture Date/Time:  23 07:00:00   Rupture Type: SROM  Fluid Color: Meconium  Meconium Consistency: Thick  Fluid Odor: None  Induction: None  Augmentation: None  Labor Complications:  Other (Comment), Cord around body, Fetal Intolerance              Anesthesia    Method: General       Delivery Details      Delivery Date: 23 Delivery Time: 09:13:00   Delivery Type: , Low Transverse  Trial of Labor?: No   Categorization: Primary   Priority: emergent  Indications for : Fetal Intolerance of Labor       Skin Incision Type: Low Transverse  Uterine Incision: Low Transverse       [place] Presentation    Presentation: Vertex  _: Occiput       Shoulder Dystocia    Shoulder Dystocia Present?: No                                                                                                           Assisted Delivery Details    Forceps Attempted?: No  Vacuum Extractor Attempted?: No                                                             Cord    Vessels: 3 Vessels  Complications: Nuchal Loose  Cord Around: Head  Delayed Cord Clamping?: No  Cord Clamped Date/Time: 2023 09:13:00  Cord Blood Disposition: Discard  Gases Sent?: Yes              Placenta    Date/Time: 2023 09:14:00  Removal: Expressed  Appearance: Intact  Disposition: Discarded       Lacerations    Episiotomy: None  Perineal Lacerations: None  Other Lacerations: no non-perineal laceration       Vaginal Counts    Initial Count Personnel: N/A  Initial Count Verified By: N/A  Final Count Personnel: N/A  Final Count Verified By: N/A       Blood Loss  Mother: Teofilo Amaya #[id number]     Start of Mother's Information      Delivery Blood Loss  23 - 23 1002      Quantitative Blood Loss

## 2023-09-20 NOTE — DISCHARGE SUMMARY
Obstetrical Discharge Summary     Name: Arianna Penn MRN: [id number]  SSN: [id number]    YOB: 1995  Age: 34 y.o. Sex: female      Admit Date: 9/20/2023    Discharge Date: 9/22/2023     Attending Physician:  Henry Rangel MD     Delivering Physician:  Bg Cisneros MD     * Admission Diagnoses:   IUP @ 40w1d     * Discharge Diagnoses:   Delivery of a VFI via LTCS by Bg Cisneros MD on 9/20/2023. Apgars were 8 and 9. Additional Diagnoses:  No components found for: \"OBEXTABORH\", \"OBEXTABSCRN\", \"OBEXTRUBELLA\", \"OBEXTGRBS\"   There is no immunization history on file for this patient. * Procedures:   Primary LTCS     * Discharge Condition: [facility] Course: Normal hospital course following the delivery. * Disposition: Home    Discharge Medications:      Medication List        START taking these medications      acetaminophen 500 MG tablet  Commonly known as: TYLENOL  Take 2 tablets by mouth in the morning and 2 tablets at noon and 2 tablets in the evening. ibuprofen 800 MG tablet  Commonly known as: ADVIL;MOTRIN  Take 1 tablet by mouth in the morning and 1 tablet at noon and 1 tablet in the evening. oxyCODONE 5 MG immediate release tablet  Commonly known as: ROXICODONE  Take 1 tablet by mouth every 6 hours as needed for Pain for up to 3 days. Max Daily Amount: 20 mg               Where to Get Your Medications        These medications were sent to [address]  [address]      Phone: [phone]   acetaminophen 500 MG tablet  ibuprofen 800 MG tablet  oxyCODONE 5 MG immediate release tablet           * Follow-up Care/Patient Instructions:   Activity: Activity as tolerated  Diet: Regular Diet  Wound Care: As directed      Followup 6 weeks for PP check        Signed By:  Henry Rangel MD     September 20, 2023

## 2023-09-20 NOTE — DISCHARGE INSTRUCTIONS
Discharge Instructions for  Section    Patient ID:  [facility]  [id number]  34 y.o.  1995    Continue taking your prenatal vitamins if you are breastfeeding. Follow-up care is a key part of your treatment and safety. Be sure to keep all your scheduled appointments    Activity  Avoid heavy lifting greater than 10lbs for 2 weeks after your surgery  Pelvic rest for 6 weeks, ie, nothing in your vagina for 6 weeks  (no intercourse, tampons, or douching). No tubs for 6 weeks. No driving for 2 weeks and until you are no longer taking prescription pain medications and you can put your foot on the break in a hurry    Limit climbing stairs to only when necessary the first 1-2 weeks. Hold the railing and do not carry your baby up and downstairs at first for safety   You may walk as tolerated, though be care to not over-do it. Walking will assist in overall healing, decrease constipation and bloating. Karie Gilliam feel better more quickly with some daily movement. Diet  Regular diet as tolerated    Wound care  There are several types of incision closures. If you have steri strips covering your incision, you may remove them as they fall off or be sure to remove them about one week after your surgery. Removing them in the shower may be easier. Additional wound care  Clear or reddish drainage from your incision is normal.  It's best to leave it open to the air, but if there is drainage, you may cover the incision lightly with gauze, preferably without tape. Keep the incision clean and dry. Numbness of the skin at or around your incision is normal and the feeling usually returns gradually. Call your doctor if you have increasing drainage from your incision, an unpleasant odor, red streaks, an increase in pain, or if it appears to open.       Pain Management  Continue prescription pain medication as written by discharging physician  Over the counter medications such as Tylenol and caring for the baby. Call your doctor if your symptoms seem severe, last more than 2 weeks, or seem to be getting worse instead of better. Get help immediately if you have thoughts of wanting to hurt yourself or others! Call your doctor or seek immediate medical care if you have:  Heavy vaginal bleeding, soaking through one or more pads an hour for several hours. Foul-smelling discharge from your vagina or incision. Consistent nausea and vomiting and cannot keep fluids down. Consistent pain that does not get better after you take pain medicine.   Sudden chest pain and shortness of breath  Signs of a blood clot: pain/ swelling/ increasing redness in your lower extremeties  Signs of infection: increased pain in your abdomen or vaginal area; red streaks, warmth, or tenderness of your breasts; fever of 100.5 F or greater

## 2023-09-20 NOTE — PROGRESS NOTES
1130: Bedside and Verbal shift change report given to Can Bhat RN (oncoming nurse) by Kaitlynn Courtney RN (offgoing nurse). Report included the following information Nurse Handoff Report, MAR, and Recent Results. [phone]: Spoke to Dr. Laura Perdomo and notified that patient is complaining of 7/10 pain prior to fundal and 10/10 pain with fundal and would like medication for pain control. Received verbal order with readback for Diuladid 0.5 mg IV, every 2 hours PRN for pain. Order placed in the chart. 1552: Bedside and Verbal shift change report given to Ross Barton RN (oncoming nurse) by Can Bhat RN (offgoing nurse). Report included the following information Nurse Handoff Report, MAR, and Recent Results.

## 2023-09-21 LAB
ERYTHROCYTE [DISTWIDTH] IN BLOOD BY AUTOMATED COUNT: 17.6 % (ref 11.5–14.5)
HCT VFR BLD AUTO: 27.6 % (ref 35–47)
HGB BLD-MCNC: 8.5 G/DL (ref 11.5–16)
MCH RBC QN AUTO: 23.5 PG (ref 26–34)
MCHC RBC AUTO-ENTMCNC: 30.8 G/DL (ref 30–36.5)
MCV RBC AUTO: 76.2 FL (ref 80–99)
NRBC # BLD: 0 K/UL (ref 0–0.01)
NRBC BLD-RTO: 0 PER 100 WBC
PLATELET # BLD AUTO: 240 K/UL (ref 150–400)
PMV BLD AUTO: 8.8 FL (ref 8.9–12.9)
RBC # BLD AUTO: 3.62 M/UL (ref 3.8–5.2)
WBC # BLD AUTO: 10.4 K/UL (ref 3.6–11)

## 2023-09-21 PROCEDURE — 85027 COMPLETE CBC AUTOMATED: CPT

## 2023-09-21 PROCEDURE — 1120000000 HC RM PRIVATE OB

## 2023-09-21 PROCEDURE — 36415 COLL VENOUS BLD VENIPUNCTURE: CPT

## 2023-09-21 PROCEDURE — 6360000002 HC RX W HCPCS: Performed by: ANESTHESIOLOGY

## 2023-09-21 PROCEDURE — 6370000000 HC RX 637 (ALT 250 FOR IP): Performed by: STUDENT IN AN ORGANIZED HEALTH CARE EDUCATION/TRAINING PROGRAM

## 2023-09-21 RX ORDER — FERROUS SULFATE 325(65) MG
325 TABLET ORAL
Status: DISCONTINUED | OUTPATIENT
Start: 2023-09-21 | End: 2023-09-23 | Stop reason: HOSPADM

## 2023-09-21 RX ADMIN — IBUPROFEN 600 MG: 600 TABLET ORAL at 09:38

## 2023-09-21 RX ADMIN — ACETAMINOPHEN 650 MG: 325 TABLET ORAL at 19:14

## 2023-09-21 RX ADMIN — ACETAMINOPHEN 650 MG: 325 TABLET ORAL at 07:50

## 2023-09-21 RX ADMIN — DOCUSATE SODIUM 100 MG: 100 CAPSULE, LIQUID FILLED ORAL at 19:13

## 2023-09-21 RX ADMIN — DOCUSATE SODIUM 100 MG: 100 CAPSULE, LIQUID FILLED ORAL at 09:39

## 2023-09-21 RX ADMIN — FERROUS SULFATE TAB 325 MG (65 MG ELEMENTAL FE) 325 MG: 325 (65 FE) TAB at 09:38

## 2023-09-21 RX ADMIN — ACETAMINOPHEN 650 MG: 325 TABLET ORAL at 13:55

## 2023-09-21 RX ADMIN — IBUPROFEN 600 MG: 600 TABLET ORAL at 19:14

## 2023-09-21 RX ADMIN — ACETAMINOPHEN 650 MG: 325 TABLET ORAL at 23:39

## 2023-09-21 ASSESSMENT — PAIN SCALES - GENERAL
PAINLEVEL_OUTOF10: 5
PAINLEVEL_OUTOF10: 5
PAINLEVEL_OUTOF10: 7

## 2023-09-21 ASSESSMENT — PAIN DESCRIPTION - LOCATION
LOCATION: INCISION
LOCATION: ABDOMEN;INCISION
LOCATION: ABDOMEN;INCISION

## 2023-09-21 ASSESSMENT — PAIN DESCRIPTION - DESCRIPTORS
DESCRIPTORS: SORE;ACHING
DESCRIPTORS: ACHING
DESCRIPTORS: CRAMPING;SORE

## 2023-09-21 ASSESSMENT — PAIN - FUNCTIONAL ASSESSMENT: PAIN_FUNCTIONAL_ASSESSMENT: ACTIVITIES ARE NOT PREVENTED

## 2023-09-21 ASSESSMENT — PAIN DESCRIPTION - ORIENTATION
ORIENTATION: LOWER

## 2023-09-21 NOTE — OP NOTE
[address] [place] [address]  OPERATIVE REPORT    Name:  Zaida Miguel  MR#:  [id number]  :  1995  ACCOUNT #:  [de-identified]  DATE OF SERVICE:  2023    PREOPERATIVE DIAGNOSIS:  Non-reassuring fetal heart tracing, remote from delivery. POSTOPERATIVE DIAGNOSIS:  Non-reassuring fetal heart tracing, remote from delivery. PROCEDURE PERFORMED:  Low-segment transverse  section. SURGEON:  Maria Isabel Cuellar. Nick Caballero MD    ASSISTANT:  Dr. Nahomy Mariscal. ANESTHESIA:  General.    COMPLICATIONS:  None. SPECIMENS SENT TO PATHOLOGY:  Placenta. IMPLANTS:  ***. ESTIMATED BLOOD LOSS:  880 mL. IV FLUIDS:  1700 mL. URINE OUTPUT:  80 mL. FINDINGS:  Meconium-stained fluid on the amniotomy. Fetus in cephalic presentation. Cord around the body x1. Normal fallopian tubes and ovaries bilaterally, pedunculated fibroid on posterior fundus of uterus. PROCEDURE IN DETAIL:  After informed consent was obtained, the patient was taken to the operating room where she underwent general anesthesia. She was moved from her bed to the operating table and placed in the dorsal supine position. A Betadine splash was performed on the patient's abdomen for surgical prep. A Lipscomb catheter was placed and was noted to be draining adequately. The patient was then prepped and draped. A surgical time-out was performed where the patient was identified by name, medical record number, and date of birth, the correct procedure was then stated. Once anesthesia was noted to be adequate, anesthesiologist notified us that the procedure could begin. A Pfannenstiel skin incision was made with a scalpel. The incision was taken down to the fascia with the scalpel. The fascia was incised and extended laterally. The superior aspect of the fascia was elevated with Kochers and the underlying rectus muscle was dissected off with the Bovie.   In a similar fashion, the posterior aspect of the fascia was elevated with

## 2023-09-21 NOTE — PROGRESS NOTES
0700 - Verbal shift change report given to [facility], RN (oncoming nurse) by [facility] RN (offgoing nurse). Report included the following information Nurse Handoff Report. 1415 - Report given to ELVIS Mckeon RN.

## 2023-09-21 NOTE — LACTATION NOTE
This note was copied from a baby's chart. This is mother's first baby. Mother states baby has been breastfeeding well. LC reviewed feeding cues, what to expect re: baby's output, feed on demand, proper latch, hand expression. Discussed with mother her plan for feeding. Reviewed the benefits of exclusive breast milk feeding during the hospital stay. Informed her of the risks of using formula to supplement in the first few days of life as well as the benefits of successful breast milk feeding; referred her to the Breastfeeding booklet about this information. She acknowledges understanding of information reviewed and states that it is her plan to breastfeed her infant. Will support her choice and offer additional information as needed. Encouraged mom to attempt feeding with baby led feeding cues. Just as sucking on fingers, rooting, mouthing. Looking for 8-12 feedings in 24 hours. Don't limit baby at breast, allow baby to come of breast on it's own. Baby may want to feed  often and may increase number of feedings on second day of life. Skin to skin encouraged. If baby doesn't nurse,  Mom should  hand express  10-20 drops of colostrum and drip into baby's mouth, or give to baby by finger feeding, cup feeding, or spoon feeding at least every 2-3 hours. Pt will successfully establish breastfeeding by feeding in response to early feeding cues   or wake every 3h, will obtain deep latch, and will keep log of feedings/output. Taught to BF at hunger cues and or q 2-3 hrs and to offer 10-20 drops of hand expressed colostrum at any non-feeds.                                     Latch: Grasps breast, tongue down, lips flanged, rhythmic sucking  Audible Swallowing: A few with stimulation  Type of Nipple: Everted (after stimulation)  Comfort (Breast/Nipple): Soft/non-tender  Hold (Positioning): Full assist, teach one side, mother does other, staff holds  [facility] Score: 8  Breast Care: Lanolin provided

## 2023-09-22 PROCEDURE — 6370000000 HC RX 637 (ALT 250 FOR IP): Performed by: STUDENT IN AN ORGANIZED HEALTH CARE EDUCATION/TRAINING PROGRAM

## 2023-09-22 PROCEDURE — 1120000000 HC RM PRIVATE OB

## 2023-09-22 RX ORDER — OXYCODONE HYDROCHLORIDE 5 MG/1
5 TABLET ORAL EVERY 6 HOURS PRN
Qty: 12 TABLET | Refills: 0 | Status: SHIPPED | OUTPATIENT
Start: 2023-09-22 | End: 2023-09-25

## 2023-09-22 RX ORDER — IBUPROFEN 800 MG/1
800 TABLET ORAL EVERY 8 HOURS
Qty: 60 TABLET | Refills: 0 | Status: SHIPPED | OUTPATIENT
Start: 2023-09-22

## 2023-09-22 RX ORDER — ACETAMINOPHEN 500 MG
1000 TABLET ORAL EVERY 8 HOURS
Qty: 60 TABLET | Refills: 0 | Status: SHIPPED | OUTPATIENT
Start: 2023-09-22

## 2023-09-22 RX ADMIN — ACETAMINOPHEN 650 MG: 325 TABLET ORAL at 20:24

## 2023-09-22 RX ADMIN — ACETAMINOPHEN 650 MG: 325 TABLET ORAL at 16:27

## 2023-09-22 RX ADMIN — IBUPROFEN 600 MG: 600 TABLET ORAL at 16:27

## 2023-09-22 RX ADMIN — IBUPROFEN 600 MG: 600 TABLET ORAL at 09:09

## 2023-09-22 RX ADMIN — DOCUSATE SODIUM 100 MG: 100 CAPSULE, LIQUID FILLED ORAL at 09:08

## 2023-09-22 RX ADMIN — ACETAMINOPHEN 650 MG: 325 TABLET ORAL at 09:09

## 2023-09-22 RX ADMIN — FERROUS SULFATE TAB 325 MG (65 MG ELEMENTAL FE) 325 MG: 325 (65 FE) TAB at 09:08

## 2023-09-22 RX ADMIN — IBUPROFEN 600 MG: 600 TABLET ORAL at 22:47

## 2023-09-22 RX ADMIN — DOCUSATE SODIUM 100 MG: 100 CAPSULE, LIQUID FILLED ORAL at 20:26

## 2023-09-22 ASSESSMENT — PAIN DESCRIPTION - ORIENTATION
ORIENTATION: LOWER
ORIENTATION: LOWER

## 2023-09-22 ASSESSMENT — PAIN DESCRIPTION - LOCATION
LOCATION: INCISION
LOCATION: ABDOMEN
LOCATION: INCISION
LOCATION: ABDOMEN;INCISION

## 2023-09-22 ASSESSMENT — PAIN - FUNCTIONAL ASSESSMENT
PAIN_FUNCTIONAL_ASSESSMENT: ACTIVITIES ARE NOT PREVENTED
PAIN_FUNCTIONAL_ASSESSMENT: ACTIVITIES ARE NOT PREVENTED

## 2023-09-22 ASSESSMENT — PAIN DESCRIPTION - DESCRIPTORS
DESCRIPTORS: DISCOMFORT;SORE
DESCRIPTORS: ACHING;SORE;DULL
DESCRIPTORS: ACHING
DESCRIPTORS: SORE;DULL;ACHING

## 2023-09-22 ASSESSMENT — PAIN SCALES - GENERAL
PAINLEVEL_OUTOF10: 4
PAINLEVEL_OUTOF10: 4
PAINLEVEL_OUTOF10: 3
PAINLEVEL_OUTOF10: 4

## 2023-09-22 NOTE — LACTATION NOTE
This note was copied from a baby's chart. Mother states baby has been latching on and breastfeeding well. Baby nursed last at 8 am for 30 minutes. LC discussed the following:    Reviewed breastfeeding basics:  Supply and demand,  stomach size, early  Feeding cues, skin to skin, positioning and baby led latch-on, assymetrical latch with signs of good, deep latch vs shallow, feeding frequency and duration, and log sheet for tracking infant feedings and output. Breastfeeding Booklet and Warm line information given. Discussed typical  weight loss and the importance of infant weight checks with pediatrician 1-2 post discharge. Discussed eating a healthy diet. Instructed mother to eat a variety of foods in order to get a well balanced diet. She should consume an extra 500 calories per day (more than her non-pregnant requirement.) These extra calories will help provide energy needed for optimal breast milk production. Mother also encouraged to \"drink to thirst\" and it is recommended that she drink fluids such as water, fruit/vegetable juice. Nutritious snacks should be available so that she can eat throughout the day to help satisfy her hunger and maintain a good milk supply. Discussed what to do if she gets engorged or if her nipples become sore:    Engorgement Care Guidelines:  Reviewed how milk is made and normal phases of milk production. Taught care of engorged breasts - physiologic breastfeeding encouraged with use of cool packs (no ice directly on skin). Consider use of NSAIDS where appropriate for discomfort and inflammation. Can employ light touch, lymphatic drainage techniques on tender grandular tissues. Anticipatory guidance shared.       Care for sore/tender nipples discussed:  ways to improve positioning and latch practiced and discussed, hand express colostrum after feedings and let air dry, light application of lanolin, hydrogel pads, seek comfortable laid back feeding position, start feedings on least sore side first.     Reviewed symptoms of mastitis and to notify her OB doctor. Discussed pumping/storage and preparation of expressed breast milk. Mother will successfully establish breastfeeding by feeding in response to early feeding cues   or wake every 3h, will obtain deep latch, and will keep log of feedings/output. Taught to BF at hunger cues and or q 2-3 hrs and to offer 10-20 drops of hand expressed colostrum at any non-feeds.       Left Breast: Soft (large)  Left Nipple: Protrude  Right Nipple: Protrude  Right Breast: Soft (large)                      Breast Care: Lanolin provided

## 2023-09-23 VITALS
HEART RATE: 75 BPM | HEIGHT: 65 IN | BODY MASS INDEX: 39.65 KG/M2 | WEIGHT: 238 LBS | DIASTOLIC BLOOD PRESSURE: 82 MMHG | SYSTOLIC BLOOD PRESSURE: 128 MMHG | RESPIRATION RATE: 16 BRPM | TEMPERATURE: 98.2 F | OXYGEN SATURATION: 100 %

## 2023-09-23 PROCEDURE — 6370000000 HC RX 637 (ALT 250 FOR IP): Performed by: STUDENT IN AN ORGANIZED HEALTH CARE EDUCATION/TRAINING PROGRAM

## 2023-09-23 RX ADMIN — IBUPROFEN 600 MG: 600 TABLET ORAL at 03:47

## 2023-09-23 RX ADMIN — DOCUSATE SODIUM 100 MG: 100 CAPSULE, LIQUID FILLED ORAL at 10:10

## 2023-09-23 RX ADMIN — FERROUS SULFATE TAB 325 MG (65 MG ELEMENTAL FE) 325 MG: 325 (65 FE) TAB at 10:10

## 2023-09-23 RX ADMIN — ACETAMINOPHEN 650 MG: 325 TABLET ORAL at 01:04

## 2023-09-23 RX ADMIN — IBUPROFEN 600 MG: 600 TABLET ORAL at 10:10

## 2023-09-23 RX ADMIN — ACETAMINOPHEN 650 MG: 325 TABLET ORAL at 10:10

## 2023-09-23 RX ADMIN — ACETAMINOPHEN 650 MG: 325 TABLET ORAL at 06:29

## 2023-09-23 ASSESSMENT — PAIN DESCRIPTION - LOCATION
LOCATION: INCISION

## 2023-09-23 ASSESSMENT — PAIN DESCRIPTION - ORIENTATION
ORIENTATION: LOWER

## 2023-09-23 ASSESSMENT — PAIN SCALES - GENERAL
PAINLEVEL_OUTOF10: 3
PAINLEVEL_OUTOF10: 4
PAINLEVEL_OUTOF10: 3
PAINLEVEL_OUTOF10: 2

## 2023-09-23 ASSESSMENT — PAIN DESCRIPTION - DESCRIPTORS
DESCRIPTORS: SORE
DESCRIPTORS: ACHING;SORE
DESCRIPTORS: DISCOMFORT;SORE
DESCRIPTORS: SORE

## 2023-09-23 ASSESSMENT — PAIN - FUNCTIONAL ASSESSMENT: PAIN_FUNCTIONAL_ASSESSMENT: ACTIVITIES ARE NOT PREVENTED

## 2023-09-23 NOTE — LACTATION NOTE
This note was copied from a baby's chart. Mother and baby for discharge. Mother states baby has been latching on and breastfeeding well. Reviewed breastfeeding basics:  Supply and demand,  stomach size, early  Feeding cues, skin to skin, positioning and baby led latch-on, assymetrical latch with signs of good, deep latch vs shallow, feeding frequency and duration, and log sheet for tracking infant feedings and output. Breastfeeding Booklet and Warm line information given. Discussed typical  weight loss and the importance of infant weight checks with pediatrician 1-2 post discharge. Discussed eating a healthy diet. Instructed mother to eat a variety of foods in order to get a well balanced diet. She should consume an extra 500 calories per day (more than her non-pregnant requirement.) These extra calories will help provide energy needed for optimal breast milk production. Mother also encouraged to \"drink to thirst\" and it is recommended that she drink fluids such as water, fruit/vegetable juice. Nutritious snacks should be available so that she can eat throughout the day to help satisfy her hunger and maintain a good milk supply. Discussed what to do if she gets engorged or if her nipples become sore:    Engorgement Care Guidelines:  Reviewed how milk is made and normal phases of milk production. Taught care of engorged breasts - physiologic breastfeeding encouraged with use of cool packs (no ice directly on skin). Consider use of NSAIDS where appropriate for discomfort and inflammation. Can employ light touch, lymphatic drainage techniques on tender grandular tissues. Anticipatory guidance shared.       Care for sore/tender nipples discussed:  ways to improve positioning and latch practiced and discussed, hand express colostrum after feedings and let air dry, light application of lanolin, hydrogel pads, seek comfortable laid back feeding position, start feedings on least sore side first.     Reviewed symptoms of mastitis and to notify her OB doctor. Reviewed pumping/storage and preparation of expressed breast milk. Mother  will successfully establish breastfeeding by feeding in response to early feeding cues   or wake every 3h, will obtain deep latch, and will keep log of feedings/output. Taught to BF at hunger cues and or q 2-3 hrs and to offer 10-20 drops of hand expressed colostrum at any non-feeds. Left Breast: Soft, Filling  Left Nipple: Protrude  Right Nipple: Protrude  Right Breast: Soft, Filling  Position and Latch: Independently                   Breast Care: Bra on, Lanolin provided     Lactation Comment: Mother and baby for discharge. Mother last breast fed her baby at 4726 for 16 minutes - mother states baby breast fed well. Chart shows numerous feedings, void, stool WNL. Discussed importance of monitoring outputs and feedings on first week of life. Discussed ways to tell if baby is  getting enough breast milk, ie  voids and stools, change in color of stool, and return to birth wt within 2 weeks. Follow up with pediatrician visit for weight check in 1-2 days (per AAP guidelines.)  Encouraged to call Warm Line  [phone]  for any questions/problems that arise.  Mother also given breastfeeding support group dates and times for any future needs

## 2023-09-23 NOTE — PROGRESS NOTES
Patient off unit in stable condition via wheelchair with volunteers for discharge home per  MD.  Patient is to follow up in 4-6 weeks and is aware. Patient denies H/A, dizziness, nausea and or vomiting or pain at this time. Infant in car seat with mom.

## 2023-09-26 NOTE — ANESTHESIA POSTPROCEDURE EVALUATION
Department of Anesthesiology  Postprocedure Note    Patient: Christiano Brown  MRN: [id number]  YOB: 1995  Date of evaluation: 2023      Procedure Summary     Date: 23 Room / Location: [facility] L&D OR    Anesthesia Start: 905 Anesthesia Stop: 1019    Procedures:        SECTION (Abdomen)       Diagnosis:       Non-reassuring fetal heart rate or rhythm affecting management of mother      (Non-reassuring fetal heart rate or rhythm affecting management of mother [B65.2870])    Surgeons: Dung Scales DO Responsible Provider: Darrian Kelly DO    Anesthesia Type: General ASA Status: 2 - Emergent          Anesthesia Type: General    Aidan Phase I: Aidan Score: 10    Aidan Phase II:        Anesthesia Post Evaluation    Comments: Discharged by nursing per protocol.

## (undated) DEVICE — ELECTRODE PT RET AD L9FT HI MOIST COND ADH HYDRGEL CORDED

## (undated) DEVICE — SUTURE MCRYL + SZ 4-0 L27IN ABSRB UD L19MM PS-2 3/8 CIR MCP426H

## (undated) DEVICE — SUTURE VCRL + ANTIBACT NO 1 CT 36IN ABSRB BRAID UD VCP959H

## (undated) DEVICE — CANISTER, RIGID, 3000CC: Brand: MEDLINE INDUSTRIES, INC.

## (undated) DEVICE — TOTAL TRAY, 16FR 10ML SIL FOLEY, URN: Brand: MEDLINE

## (undated) DEVICE — PENCIL ES L3M ROCK SWCH S STL HEX LOK BLDE ELECTRD HOLSTER

## (undated) DEVICE — GOWN,SIRUS,FABRNF,XL,20/CS: Brand: MEDLINE

## (undated) DEVICE — TOWEL,OR,DSP,ST,BLUE,STD,2/PK,40PK/CS: Brand: MEDLINE

## (undated) DEVICE — APPLICATOR MEDICATED 26 CC SOLUTION HI LT ORNG CHLORAPREP

## (undated) DEVICE — SOLIDIFIER FLD 3.2OZ 3000CC TRAD IN BTL LIQUI-LOC

## (undated) DEVICE — CLEANER ES TIP W2XL2IN ADH BK RADPQ FOR S STL ELECTRD

## (undated) DEVICE — LARGE, DISPOSABLE ALEXIS O C-SECTION PROTECTOR - RETRACTOR: Brand: ALEXIS ® O C-SECTION PROTECTOR - RETRACTOR

## (undated) DEVICE — STRAP,POSITIONING,KNEE/BODY,FOAM,4X60": Brand: MEDLINE

## (undated) DEVICE — SYRINGE IRRIG 60ML SFT PLIABLE BLB EZ TO GRP 1 HND USE W/

## (undated) DEVICE — SUTURE PLN GUT SZ 3-0 L27IN ABSRB YELLOWISH TAN L40MM CT 852H

## (undated) DEVICE — C-SECTION-SFMC: Brand: MEDLINE INDUSTRIES, INC.

## (undated) DEVICE — INTENT OT USE PROVIDES A STERILE INTERFACE BETWEEN THE OPERATING ROOM SURGICAL LAMPS (NON-STERILE) AND THE SURGEON OR STAFF WORKING IN THE STERILE FIELD.: Brand: ASPEN® ALC PLUS LIGHT HANDLE COVER

## (undated) DEVICE — SOLUTION IRRIG 1000ML 0.9% SOD CHL USP POUR PLAS BTL

## (undated) DEVICE — SUTURE VCRL + SZ 0 L36IN ABSRB VLT L40MM CT 1/2 CIR TAPR VCP358H